# Patient Record
Sex: MALE | Race: WHITE
[De-identification: names, ages, dates, MRNs, and addresses within clinical notes are randomized per-mention and may not be internally consistent; named-entity substitution may affect disease eponyms.]

---

## 2021-01-15 ENCOUNTER — HOSPITAL ENCOUNTER (EMERGENCY)
Dept: HOSPITAL 41 - JD.ED | Age: 36
Discharge: HOME | End: 2021-01-15
Payer: SELF-PAY

## 2021-01-15 DIAGNOSIS — L03.012: ICD-10-CM

## 2021-01-15 DIAGNOSIS — T23.502A: ICD-10-CM

## 2021-01-15 DIAGNOSIS — T23.501A: ICD-10-CM

## 2021-01-15 DIAGNOSIS — L03.011: ICD-10-CM

## 2021-01-15 DIAGNOSIS — T65.891A: Primary | ICD-10-CM
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
